# Patient Record
Sex: MALE | Race: WHITE | Employment: FULL TIME | URBAN - NONMETROPOLITAN AREA
[De-identification: names, ages, dates, MRNs, and addresses within clinical notes are randomized per-mention and may not be internally consistent; named-entity substitution may affect disease eponyms.]

---

## 2022-02-09 ENCOUNTER — HOSPITAL ENCOUNTER (EMERGENCY)
Age: 22
Discharge: HOME | End: 2022-02-09
Attending: EMERGENCY MEDICINE
Payer: MEDICAID

## 2022-02-09 ENCOUNTER — APPOINTMENT (OUTPATIENT)
Dept: GENERAL RADIOLOGY | Age: 22
End: 2022-02-09
Payer: MEDICAID

## 2022-02-09 VITALS
HEART RATE: 82 BPM | RESPIRATION RATE: 13 BRPM | HEIGHT: 67 IN | DIASTOLIC BLOOD PRESSURE: 71 MMHG | WEIGHT: 120 LBS | OXYGEN SATURATION: 99 % | SYSTOLIC BLOOD PRESSURE: 102 MMHG | TEMPERATURE: 98 F | BODY MASS INDEX: 18.83 KG/M2

## 2022-02-09 DIAGNOSIS — F41.1 ANXIETY STATE: Primary | ICD-10-CM

## 2022-02-09 DIAGNOSIS — R07.89 ATYPICAL CHEST PAIN: ICD-10-CM

## 2022-02-09 LAB
EKG ATRIAL RATE: 111 BPM
EKG P AXIS: 74 DEGREES
EKG P-R INTERVAL: 114 MS
EKG Q-T INTERVAL: 314 MS
EKG QRS DURATION: 80 MS
EKG QTC CALCULATION (BAZETT): 427 MS
EKG R AXIS: 72 DEGREES
EKG T AXIS: 67 DEGREES
EKG VENTRICULAR RATE: 111 BPM

## 2022-02-09 PROCEDURE — 99205 OFFICE O/P NEW HI 60 MIN: CPT

## 2022-02-09 PROCEDURE — 93005 ELECTROCARDIOGRAM TRACING: CPT

## 2022-02-09 PROCEDURE — 99204 OFFICE O/P NEW MOD 45 MIN: CPT | Performed by: EMERGENCY MEDICINE

## 2022-02-09 PROCEDURE — 71046 X-RAY EXAM CHEST 2 VIEWS: CPT

## 2022-02-09 PROCEDURE — 99283 EMERGENCY DEPT VISIT LOW MDM: CPT

## 2022-02-09 PROCEDURE — G0463 HOSPITAL OUTPT CLINIC VISIT: HCPCS

## 2022-02-09 ASSESSMENT — ENCOUNTER SYMPTOMS
STRIDOR: 0
SORE THROAT: 0
VOICE CHANGE: 0
NAUSEA: 0
SHORTNESS OF BREATH: 1
WHEEZING: 0
COUGH: 0
SINUS PRESSURE: 0
EYE DISCHARGE: 0
EYE REDNESS: 0
TROUBLE SWALLOWING: 0
VOMITING: 0
DIARRHEA: 0
BACK PAIN: 0
ABDOMINAL PAIN: 0
EYE PAIN: 0
ROS SKIN COMMENTS: NO RASH OR BRUISE

## 2022-02-09 NOTE — ED PROVIDER NOTES
[address] [facility] [address] [address] EMERGENCY DEPT  [facility]       Chief Complaint   Patient presents with    Chest Pain      onset 9 days off and on with  episodes of anxiety per pt statement       Nurses Notes reviewed and I agree except as noted in the HPI. HISTORY OF PRESENT ILLNESS   Lisette Gonzalez is a 25 y.o. male who presents with 9-day history of anterior chest pain that is intermittent associated with shortness of breath palpitations and anxiety. No syncope. Patient currently rates chest discomfort at 6-10 severity. Patient has 60 pound weight loss over the last year. No history of thyroid disease, heart disease, diabetes. No previous treatment for anxiety or hyperventilation. Vapes and drinks caffeine. No street drugs. REVIEW OF SYSTEMS     Review of Systems   Constitutional: Positive for appetite change. Negative for chills, fatigue, fever and unexpected weight change. Appetite change and anxiety   HENT: Negative for congestion, ear discharge, ear pain, sinus pressure, sneezing, sore throat, trouble swallowing and voice change. No respiratory symptoms   Eyes: Negative for pain, discharge and redness. No redness or drainage   Respiratory: Positive for shortness of breath. Negative for cough, wheezing and stridor. Shortness of breath no wheezing   Cardiovascular: Negative for chest pain and leg swelling. Chest pain no syncope   Gastrointestinal: Negative for abdominal pain, diarrhea, nausea and vomiting. No abdominal pain or vomiting   Endocrine:        60 pound weight loss   Genitourinary: Negative for dysuria, frequency, hematuria and urgency. Musculoskeletal: Negative for arthralgias, back pain, myalgias and neck pain. Skin: Negative for rash. No rash or bruise   Neurological: Negative for dizziness, syncope, weakness and headaches. No headache   Hematological: Negative for adenopathy.         Anxiety and nervousness Psychiatric/Behavioral: Negative for behavioral problems, confusion, sleep disturbance and suicidal ideas. The patient is not nervous/anxious. red and bold elements reviewed    PAST MEDICAL HISTORY   History reviewed. No pertinent past medical history. History of heart disease, diabetes, thyroid disease, asthma  SURGICAL HISTORY     Patient  has no past surgical history on file. No previous surgeries   CURRENT MEDICATIONS       Previous Medications    No medications on file       ALLERGIES     Patient is has No Known Allergies. FAMILY HISTORY     Patient'sfamily history is not on file. Thyroid disease no heart disease or diabetes  SOCIAL HISTORY     Patient  reports that he has never smoked. He has never used smokeless tobacco. He reports previous alcohol use. He reports current drug use. Drug: Marijuana Gale Santa). Currently vapes, no cocaine or stimulants  PHYSICAL EXAM     ED TRIAGE VITALS  BP: 137/83, Temp: 98.5 °F (36.9 °C), Heart Rate: 85, Resp: 18, SpO2: 99 %  Physical Exam  Vitals and nursing note reviewed. Constitutional:       General: He is in acute distress. Appearance: He is well-developed. He is not ill-appearing. Comments: Moist membranes normal airway. Patient anxious   HENT:      Head: Normocephalic and atraumatic. Right Ear: Tympanic membrane and external ear normal.      Left Ear: Tympanic membrane and external ear normal.      Nose: Nose normal.      Mouth/Throat:      Pharynx: No oropharyngeal exudate. Comments: Oropharynx normal  Eyes:      General: No scleral icterus. Right eye: No discharge. Left eye: No discharge. Extraocular Movements:      Right eye: Normal extraocular motion. Left eye: Normal extraocular motion. Conjunctiva/sclera: Conjunctivae normal.      Pupils: Pupils are equal, round, and reactive to light. Comments: Conjunctiva clear   Neck:      Thyroid: No thyroid mass, thyromegaly or thyroid tenderness. Vascular: No JVD. Comments: Thyroid nonpalpable  Cardiovascular:      Rate and Rhythm: Normal rate and regular rhythm. Pulses: Normal pulses. Heart sounds: Normal heart sounds, S1 normal and S2 normal. No murmur heard. No friction rub. No gallop. Comments: No murmur  Pulmonary:      Effort: Pulmonary effort is normal. No tachypnea or respiratory distress. Breath sounds: Normal breath sounds. No stridor. No decreased breath sounds, wheezing, rhonchi or rales. Comments: No cough lungs clear  Chest:      Chest wall: No tenderness. Abdominal:      General: Bowel sounds are normal. There is no distension. Palpations: Abdomen is soft. There is no mass. Tenderness: There is no abdominal tenderness. There is no guarding or rebound. Comments: Soft nontender   Musculoskeletal:         General: No tenderness. Normal range of motion. Cervical back: Normal range of motion. Comments: Joints normal extremities normal   Lymphadenopathy:      Cervical: No cervical adenopathy. Right cervical: No superficial cervical adenopathy. Left cervical: No superficial cervical adenopathy. Skin:     General: Skin is warm and dry. Findings: No erythema or rash. Comments: No rash or bruising   Neurological:      Mental Status: He is alert and oriented to person, place, and time. Cranial Nerves: No cranial nerve deficit. Motor: No abnormal muscle tone. Coordination: Coordination normal.      Deep Tendon Reflexes: Reflexes are normal and symmetric. Reflexes normal.      Comments: Anxious without focal neurologic deficit. No tremor   Psychiatric:         Behavior: Behavior normal.         Thought Content:  Thought content normal.         Judgment: Judgment normal.      Comments: Anxiety without psychosis         DIAGNOSTIC RESULTS   Labs:   Results for orders placed or performed during the hospital encounter of 02/09/22   EKG 12 Lead   Result Value Ref Range    Ventricular Rate 111 BPM    Atrial Rate 111 BPM    P-R Interval 114 ms    QRS Duration 80 ms    Q-T Interval 314 ms    QTc Calculation (Bazett) 427 ms    P Axis 74 degrees    R Axis 72 degrees    T Axis 67 degrees   Initial EKG with sinus tachycardia, repeat EKG normal sinus rhythm rate of 70 within normal limits per my interpretation. IMAGING:  No orders to display     URGENT CARE COURSE:     Vitals:    02/09/22 1013   BP: 137/83   Pulse: 85   Resp: 18   Temp: 98.5 °F (36.9 °C)   TempSrc: Temporal   SpO2: 99%   Weight: 120 lb (54.4 kg)   Height: 5' 7\" (1.702 m)       Medications - No data to display  PROCEDURES:  None  FINALIMPRESSION      1. Acute chest pain    2. Weight loss        DISPOSITION/PLAN   DISPOSITION    Patient transferred to [facility] ED per his request.  He is stable for private vehicle transfer. Patient accepted in transfer by [facility] ED by charge nurse at [phone]. PATIENT REFERRED TO:  to [facility] ED      to [facility]    DISCHARGE MEDICATIONS:  New Prescriptions    No medications on file     There are no discharge medications for this patient.       MD Marisel Garcia MD  02/09/22 8516

## 2022-02-09 NOTE — ED PROVIDER NOTES
[facility] EMERGENCY DEPT      CHIEF COMPLAINT       Chief Complaint   Patient presents with    Chest Pain      onset 9 days off and on with  episodes of anxiety per pt statement       Nurses Notes reviewed and I agree except as noted in the HPI. HISTORY OF PRESENT ILLNESS    Miguelina Chery is a 25 y.o. male who presents with complaint of intermittent sharp chest pains, moving all over his chest thinks that it is brought on with anxiety. However, states that whenever he has any random pain anywhere in his body becomes really anxious. Patient otherwise denies history of heart disease, no shortness of breath, no cough, no fever chills, no history pneumothorax. He has had no recent trauma to his chest.  Concerning weight loss, patient states that he  recently lost some weight, but said he normally fluctuates between being overweight and losing some weight. States that he has been having worsening anxiety, but he has been eating his usual.  Onset: Chronic  Duration: Months, but reports an increase in the past 9 days  Timing: Intermittent persistent  Location of Pain: Random chest pain, short-lived, sharp in nature  Intesity/severity:   Modifying Factors: Brought on by anxiety, or brings on anxiety. Relieved by;  Previous Episodes; Tx Before arrival: None  REVIEW OF SYSTEMS      Review of Systems   Constitutional: Negative for fever, chills, diaphoresis and fatigue. HENT: Negative for congestion, drooling, facial swelling and sore throat. Eyes: Negative for photophobia, pain and discharge. Respiratory: Negative for cough, shortness of breath, wheezing and stridor. Cardiovascular: Negative for palpitations and leg swelling. Positive for chest pains. Gastrointestinal: Negative for abdominal pain, blood in stool and abdominal distention. Endocrine: Negative for cold intolerance, heat intolerance, polydipsia and polyuria.    Genitourinary: Negative for dysuria, urgency, hematuria and difficulty urinating. Musculoskeletal: Negative for gait problem, neck pain and neck stiffness. Skin; No rash, No itching  Neurological: Negative for seizures, weakness and numbness. Psychiatric/Behavioral: Negative for hallucinations, confusion and agitation. PAST MEDICAL HISTORY    has no past medical history on file. SURGICAL HISTORY      has no past surgical history on file. CURRENT MEDICATIONS       Previous Medications    No medications on file       ALLERGIES     has No Known Allergies. FAMILY HISTORY     has no family status information on file. family history is not on file. SOCIAL HISTORY      reports that he has never smoked. He has never used smokeless tobacco. He reports previous alcohol use. He reports current drug use. Drug: Marijuana Arianna Bachelor). PHYSICAL EXAM     INITIAL VITALS:  height is 5' 7\" (1.702 m) and weight is 120 lb (54.4 kg). His oral temperature is 98 °F (36.7 °C). His blood pressure is 108/71 and his pulse is 69. His respiration is 16 and oxygen saturation is 100%. Physical Exam   Constitutional:  well-developed and well-nourished. HENT: Head: Normocephalic, atraumatic, Bilateral external ears normal, Oropharynx mosit, No oral exudates, Nose normal.   Eyes: PERRL, EOMI, Conjunctiva normal, No discharge. No scleral icterus  Neck: Normal range of motion, No tenderness, Supple  Cardiovascular: Normal rate, regular rhythm, S1 normal and S2 normal.  Exam reveals no gallop. Pulmonary/Chest: Effort normal and breath sounds normal. No accessory muscle usage or stridor. No respiratory distress. no wheezes. has no rales. exhibits no tenderness. Abdominal: Soft. Bowel sounds are normal.  exhibits no distension. There is no tenderness. There is no rebound and no guarding. Extremities: No edema, no tenderness, no cyanosis, no clubbing. Musculoskeletal: Good range of motion in major joints is observed. No major deformities noted.   Neurological: Alert and oriented ×3, normal motor function, normal sensory function, no focal deficits. GCS 15. Skin: Skin is warm, dry and intact. No rash noted. No erythema. Psychiatric: Affect normal, judgment normal, mood normal.  DIFFERENTIAL DIAGNOSIS:       DIAGNOSTIC RESULTS     EKG: All EKG's are interpreted by the Emergency Department Physician who either signs or Co-signs this chart in the absence of a cardiologist.      RADIOLOGY: non-plain film images(s) such as CT, Ultrasound and MRI are read by the radiologist.  Plain radiographic images are visualized and preliminarily interpreted by the emergency physician unless otherwise stated below. LABS:   Labs Reviewed - No data to display    EMERGENCY DEPARTMENT COURSE:   Vitals:    Vitals:    02/09/22 1013 02/09/22 1048 02/09/22 1139   BP: 137/83 130/80 108/71   Pulse: 85 71 69   Resp: 18 16 16   Temp: 98.5 °F (36.9 °C) 98 °F (36.7 °C)    TempSrc: Temporal Oral    SpO2: 99% 98% 100%   Weight: 120 lb (54.4 kg)     Height: 5' 7\" (1.702 m)     Patient presenting with complaint of chest pain evaluation, random sharp chest wall pain that are short-lived, probably brought on by anxiety but also could be making anxiety worse. Patient state that he last week, with subsequent muscle crampy pain to right SCM, the pain is gradually getting better. He has no trouble swallowing, trouble breathing, no masses/lymphadenopathy noted on evaluation. CRITICAL CARE:       CONSULTS:  None    PROCEDURES:  None    FINAL IMPRESSION      1. Acute chest pain    2.  Weight loss          DISPOSITION/PLAN   Decision To Transfer    PATIENT REFERRED TO:  to [facility]      to [facility]      DISCHARGE MEDICATIONS:  New Prescriptions    No medications on file       (Please note that portions of this note were completed with a voice recognition program.  Efforts were made to edit the dictations but occasionally words are mis-transcribed.)    Kimberlee Hartley, DO Kimberlee Hartley DO  02/12/22 141

## 2022-02-09 NOTE — LETTER
[address] [facility] [address] [address] EMERGENCY DEPT  [address] [place] [id number]  Phone: [phone]             February 9, 2022    Patient: Taylor Hill   YOB: 2000   Date of Visit: 2/9/2022       To Whom It May Concern:    Zeinab Trevino was seen and treated in our emergency department on 2/9/2022. He may return to work on 2/10/2022.       Sincerely,             Signature:__________________________________

## 2022-02-09 NOTE — ED TRIAGE NOTES
Patient arrived to room 35 with c/o chest pain. Patient stated the pain comes and goes. Patient stated currently he doesn't have any pain. Patient stated the pain happens when he has an anxiety attack. Patient stated he doesn't have a hx of anxiety.

## 2022-02-09 NOTE — ED NOTES
Pt is laying in bed at this time and denies any needs. Pt does not appear to be in any distress. Pt states that he thinks his chest pain is d/t anxiety but wants to be evaluated because he is concerned with it.       Justin Briceno RN  02/09/22 [id number]

## 2022-06-04 ENCOUNTER — HOSPITAL ENCOUNTER (EMERGENCY)
Age: 22
Discharge: HOME | End: 2022-06-04
Payer: MEDICAID

## 2022-06-04 VITALS
BODY MASS INDEX: 17.43 KG/M2 | OXYGEN SATURATION: 100 % | RESPIRATION RATE: 16 BRPM | WEIGHT: 115 LBS | TEMPERATURE: 97.5 F | DIASTOLIC BLOOD PRESSURE: 69 MMHG | HEART RATE: 100 BPM | HEIGHT: 68 IN | SYSTOLIC BLOOD PRESSURE: 121 MMHG

## 2022-06-04 DIAGNOSIS — R30.0 DYSURIA: Primary | ICD-10-CM

## 2022-06-04 LAB
BILIRUBIN URINE: NEGATIVE
BLOOD, URINE: NEGATIVE
CHARACTER, URINE: CLEAR
COLOR: YELLOW
GLUCOSE URINE: NEGATIVE MG/DL
KETONES, URINE: NEGATIVE
LEUKOCYTE ESTERASE, URINE: NEGATIVE
NITRITE, URINE: NEGATIVE
PH UA: 7.5 (ref 5–9)
PROTEIN UA: NEGATIVE MG/DL
SPECIFIC GRAVITY UA: 1.02 (ref 1–1.03)
UROBILINOGEN, URINE: 0.2 EU/DL (ref 0.2–1)

## 2022-06-04 PROCEDURE — 81003 URINALYSIS AUTO W/O SCOPE: CPT

## 2022-06-04 PROCEDURE — 99213 OFFICE O/P EST LOW 20 MIN: CPT

## 2022-06-04 PROCEDURE — 87491 CHLMYD TRACH DNA AMP PROBE: CPT

## 2022-06-04 PROCEDURE — 99213 OFFICE O/P EST LOW 20 MIN: CPT | Performed by: EMERGENCY MEDICINE

## 2022-06-04 PROCEDURE — 87591 N.GONORRHOEAE DNA AMP PROB: CPT

## 2022-06-04 ASSESSMENT — ENCOUNTER SYMPTOMS
COUGH: 0
VOMITING: 0
BACK PAIN: 0
DIARRHEA: 0
NAUSEA: 0
ABDOMINAL PAIN: 0

## 2022-06-04 NOTE — Clinical Note
Dejuan Elmore was seen and treated in our emergency department on 6/4/2022. He may return to work on 06/05/2022. If you have any questions or concerns, please don't hesitate to call.       Cristian Eller, KALYANI - CNP

## 2022-06-04 NOTE — ED PROVIDER NOTES
[facility]       CHIEF COMPLAINT       Chief Complaint   Patient presents with    Urinary Tract Infection     burning, frequency, hesitency        Nurses Notes reviewed and I agree except as noted in the HPI. HISTORY OF PRESENT ILLNESS   David Palacios is a 25 y.o. male who presents for complaints of urinary frequency, difficulty getting his stream started. Occasional burning when he pees. Symptoms have been present for approximately 3 weeks. No flank pain. No abdominal pain. Patient states he has not been sexually active in 6 months. He states once he had some mild discharge from the urethra but this was after pushing hard to try to go void. HPI    REVIEW OF SYSTEMS     Review of Systems   Constitutional: Negative for chills and fatigue. Respiratory: Negative for cough. Gastrointestinal: Negative for abdominal pain, diarrhea, nausea and vomiting. Genitourinary: Positive for difficulty urinating, frequency and urgency. Negative for flank pain, penile discharge, penile pain, penile swelling, scrotal swelling and testicular pain. Musculoskeletal: Negative for back pain. PAST MEDICAL HISTORY   No past medical history on file. SURGICALHISTORY     Patient  has no past surgical history on file. CURRENT MEDICATIONS       Previous Medications    No medications on file       ALLERGIES     Patient is has No Known Allergies. Patients   Immunization History   Administered Date(s) Administered    COVID-19, Garry Tidwell, Primary or Immunocompromised, PF, 100mcg/0.5mL 07/22/2021, 08/19/2021       FAMILY HISTORY     Patient's family history is not on file. SOCIAL HISTORY     Patient  reports that he has never smoked. He has never used smokeless tobacco. He reports previous alcohol use. He reports current drug use. Drug: Marijuana Tiffanie Kalsagrario).     PHYSICAL EXAM     ED TRIAGE VITALS  BP: 121/69, Temp: 97.5 °F (36.4 °C), Heart Rate: 100, Resp: 16, SpO2: 100 %,Estimated body mass index is 17.49 kg/m² as calculated from the following:    Height as of this encounter: 5' 8\" (1.727 m). Weight as of this encounter: 115 lb (52.2 kg). ,No LMP for male patient. Physical Exam  Constitutional:       General: He is not in acute distress. Appearance: He is normal weight. He is not ill-appearing. HENT:      Head: Normocephalic. Cardiovascular:      Rate and Rhythm: Normal rate. Pulses: Normal pulses. Pulmonary:      Effort: Pulmonary effort is normal.   Abdominal:      General: Abdomen is flat. Bowel sounds are normal.      Palpations: Abdomen is soft. Tenderness: There is no abdominal tenderness. There is no right CVA tenderness, left CVA tenderness or guarding. Skin:     General: Skin is warm and dry. Neurological:      General: No focal deficit present. Mental Status: He is alert. Psychiatric:         Mood and Affect: Mood normal.         DIAGNOSTIC RESULTS     Labs:No results found for this visit on 06/04/22. IMAGING:    No orders to display         EKG:      URGENT CARE COURSE:     Vitals:    06/04/22 1300 06/04/22 1303   BP:  121/69   Pulse:  100   Resp: 16 16   Temp:  97.5 °F (36.4 °C)   TempSrc: Temporal Temporal   SpO2:  100%   Weight:  115 lb (52.2 kg)   Height:  5' 8\" (1.727 m)       Medications - No data to display         PROCEDURES:  None    FINAL IMPRESSION      1. Dysuria          DISPOSITION/ PLAN     Patient presents for dysuria. Patient has no evidence of urinary tract infection. Urinalysis is negative for blood, no nitrites or leukocyte esterase in urinalysis. STD testing in process    Advised to drink at least 96 ounces of water per day. Tylenol for discomfort. Patient currently does not have family physician and advised to call family medicine residency clinic to set up an appointment to establish care and for reevaluation.       PATIENT REFERRED TO:  None Provider  None      DISCHARGE MEDICATIONS:  New Prescriptions No medications on file       Discontinued Medications    No medications on file       There are no discharge medications for this patient.       KALYANI Payne CNP    (Please note that portions of this note were completed with a voice recognition program. Efforts were made to edit the dictations but occasionally words are mis-transcribed.)          KALYANI Payne CNP  06/04/22 9825

## 2022-06-05 LAB
CHLAMYDIA TRACHOMATIS BY RT-PCR: NOT DETECTED
CT/NG SOURCE: NORMAL
NEISSERIA GONORRHOEAE BY RT-PCR: NOT DETECTED

## 2023-10-13 ENCOUNTER — HOSPITAL ENCOUNTER (EMERGENCY)
Age: 23
Discharge: HOME | End: 2023-10-13
Payer: MEDICAID

## 2023-10-13 VITALS
TEMPERATURE: 98.4 F | HEART RATE: 85 BPM | BODY MASS INDEX: 17.43 KG/M2 | DIASTOLIC BLOOD PRESSURE: 72 MMHG | OXYGEN SATURATION: 95 % | SYSTOLIC BLOOD PRESSURE: 109 MMHG | RESPIRATION RATE: 16 BRPM | WEIGHT: 115 LBS | HEIGHT: 68 IN

## 2023-10-13 DIAGNOSIS — N30.01 ACUTE CYSTITIS WITH HEMATURIA: Primary | ICD-10-CM

## 2023-10-13 LAB
BILIRUB UR STRIP.AUTO-MCNC: ABNORMAL MG/DL
CHARACTER UR: ABNORMAL
CHLAMYDIA TRACHOMATIS BY RT-PCR: DETECTED
COLOR: YELLOW
CT/NG SOURCE: ABNORMAL
GLUCOSE UR QL STRIP.AUTO: NEGATIVE MG/DL
KETONES UR QL STRIP.AUTO: ABNORMAL
NEISSERIA GONORRHOEAE BY RT-PCR: NOT DETECTED
NITRITE UR QL STRIP.AUTO: NEGATIVE
PH UR STRIP.AUTO: 7 [PH] (ref 5–9)
PROT UR STRIP.AUTO-MCNC: >= 300 MG/DL
RBC #/AREA URNS HPF: ABNORMAL /[HPF]
SP GR UR STRIP.AUTO: 1.02 (ref 1–1.03)
UROBILINOGEN, URINE: 4 EU/DL (ref 0.2–1)
WBC #/AREA URNS HPF: ABNORMAL /[HPF]

## 2023-10-13 PROCEDURE — 99213 OFFICE O/P EST LOW 20 MIN: CPT

## 2023-10-13 PROCEDURE — 87591 N.GONORRHOEAE DNA AMP PROB: CPT

## 2023-10-13 PROCEDURE — 87086 URINE CULTURE/COLONY COUNT: CPT

## 2023-10-13 PROCEDURE — 87491 CHLMYD TRACH DNA AMP PROBE: CPT

## 2023-10-13 PROCEDURE — 81003 URINALYSIS AUTO W/O SCOPE: CPT

## 2023-10-13 RX ORDER — SULFAMETHOXAZOLE AND TRIMETHOPRIM 800; 160 MG/1; MG/1
1 TABLET ORAL 2 TIMES DAILY
Qty: 14 TABLET | Refills: 0 | Status: SHIPPED | OUTPATIENT
Start: 2023-10-13 | End: 2023-10-15 | Stop reason: ALTCHOICE

## 2023-10-13 ASSESSMENT — ENCOUNTER SYMPTOMS: ABDOMINAL PAIN: 1

## 2023-10-13 ASSESSMENT — PAIN - FUNCTIONAL ASSESSMENT: PAIN_FUNCTIONAL_ASSESSMENT: NONE - DENIES PAIN

## 2023-10-13 NOTE — ED NOTES
Patient presents to [facility] with significant other with complaints of \"pus\" coming out when he urinates as well as frequent urination x1 year.  Patient reports he was STD tested x4-5 months ago with a negative result      Kathlene Holter, RN  10/13/23 1420

## 2023-10-13 NOTE — DISCHARGE INSTRUCTIONS
Drink lots of fluids  Decrease monster intake  Antibiotics as prescribed  No sex until symptoms resolve  Follow up with [facility] residency clinic for establishment of care  ER for worsening symptoms

## 2023-10-15 LAB — BACTERIA UR CULT: NORMAL

## 2023-10-15 RX ORDER — DOXYCYCLINE HYCLATE 100 MG
100 TABLET ORAL 2 TIMES DAILY
Qty: 14 TABLET | Refills: 0 | Status: SHIPPED | OUTPATIENT
Start: 2023-10-15 | End: 2023-10-22